# Patient Record
Sex: FEMALE | Race: WHITE
[De-identification: names, ages, dates, MRNs, and addresses within clinical notes are randomized per-mention and may not be internally consistent; named-entity substitution may affect disease eponyms.]

---

## 2018-06-13 ENCOUNTER — HOSPITAL ENCOUNTER (OUTPATIENT)
Dept: HOSPITAL 62 - WI | Age: 53
End: 2018-06-13
Payer: COMMERCIAL

## 2018-06-13 DIAGNOSIS — Z12.31: Primary | ICD-10-CM

## 2018-06-13 PROCEDURE — 77063 BREAST TOMOSYNTHESIS BI: CPT

## 2018-06-13 PROCEDURE — 77067 SCR MAMMO BI INCL CAD: CPT

## 2018-06-14 NOTE — WOMENS IMAGING REPORT
EXAM DESCRIPTION:  3D SCREENING MAMMO BILAT



COMPLETED DATE/TIME:  6/13/2018 10:40 am



REASON FOR STUDY:  ROUTINE SCREENING;Z12.31 Z12.31  ENCNTR SCREEN MAMMOGRAM FOR MALIGNANT NEOPLASM OF
 LASHAWN



COMPARISON:  Multiple since 2009



TECHNIQUE:  Standard craniocaudal and mediolateral oblique views of each breast recorded using digita
l acquisition and breast tomosynthesis.



LIMITATIONS:  None.



FINDINGS:  No masses, calcifications or architectural distortion. No areas of suspicion.

Read with the assistance of CAD.

.[facility] - R2 Cenova Version 1.3

.[facility] - R2 Cenova Version 1.3

.[facility] - R2 Cenova Version 2.4

.[facility] [place] - R2 Cenova Version 2.4

.[facility] - R2  Version 9.2



IMPRESSION:  NORMAL MAMMOGRAM.  BIRADS 1.



BREAST DENSITY:  d. The breasts are extremely dense, which lowers the sensitivity of mammography.



BIRAD:  1 NEGATIVE



RECOMMENDATION:  ROUTINE SCREENING

Please continue yearly bilateral screening tomosynthesis in June 2019



COMMENT:  The patient has been notified of the results by letter per SA requirements. Additional no
tification policies are in place for contacting patient with suspicious or incomplete findings.

Quality ID #225: The American College of Radiology recommends an annual screening mammogram for women
 aged 40 years or over. This facility utilizes a reminder system to ensure that all patients receive 
reminder letters, and/or direct phone calls for appointments. This includes reminders for routine scr
eening mammograms, diagnostic mammograms, or other Breast Imaging Interventions when appropriate.  Th
is patient will be placed in the appropriate reminder system.

The American College of Radiology (ACR) has developed recommendations for screening MRI of the breast
s in certain patient populations, to be used in conjunction with mammography.  Breast MRI surveillanc
e may be appropriate for women with more than 20% lifetime risk of developing breast cancer  as deter
mined by genetic testing, significant family history of the disease, or history of mantle radiation f
or Hodgkins Disease.  ACR Practice Guidelines 2008.

[employer]



PQRS 6045F: Fluoroscopic imaging is not utilized for breast tomosynthesis.



TECHNICAL DOCUMENTATION:  FINDING NUMBER: (1)

ASSESSMENT:  (1)

JOB ID:  [id number]

 2011 [employer]- All Rights Reserved



Reading location - IP/workstation name: [facility]-RR

## 2021-01-11 ENCOUNTER — HOSPITAL ENCOUNTER (OUTPATIENT)
Dept: HOSPITAL 62 - WI | Age: 56
End: 2021-01-11
Payer: COMMERCIAL

## 2021-01-11 DIAGNOSIS — Z12.31: Primary | ICD-10-CM

## 2021-01-11 PROCEDURE — 77067 SCR MAMMO BI INCL CAD: CPT

## 2021-01-11 PROCEDURE — 77063 BREAST TOMOSYNTHESIS BI: CPT

## 2021-01-11 NOTE — WOMENS IMAGING REPORT
EXAM DESCRIPTION:  3D SCREENING MAMMO BILAT



IMAGES COMPLETED DATE/TIME:  1/11/2021 9:24 am



REASON FOR STUDY:  ROUTINE BILATERAL SCREENING;Z12.31 Z12.31  ENCNTR SCREEN MAMMOGRAM FOR MALIGNANT N
EOPLASM OF LASHAWN



COMPARISON:  Digital tomosynthesis bilateral screening mammograms dated 6/13/2018, 6/12/2017 and 11/1
5/2010.



EXAM PARAMETERS:  Views: Standard craniocaudal and mediolateral oblique views of each breast recorded
 using digital acquisition and breast tomosynthesis.

Read with the assistance of CAD.

.[facility] - [employer]  Version 9.2



LIMITATIONS:  None.



FINDINGS:  No suspicious masses, suspicious calcifications or architectural distortion. No areas of c
oncern.



IMPRESSION:   NEGATIVE MAMMOGRAM. BIRADS 1.



BREAST DENSITY:  c. The breasts are heterogeneously dense, which may obscure small masses.



BIRAD:  ASSESSMENT:  1 NEGATIVE



RECOMMENDATION:  1.   ROUTINE SCREENING



COMMENT:  The patient has been notified of the results by letter per MQSA requirements. Additional no
tification policies are in place for contacting patient with suspicious or incomplete findings.

Quality ID #225: The American College of Radiology recommends an annual screening mammogram for women
 aged 40 years or over. This facility utilizes a reminder system to ensure that all patients receive 
reminder letters, and/or direct phone calls for appointments. This includes reminders for routine scr
eening mammograms, diagnostic mammograms, or other Breast Imaging Interventions when appropriate.  Th
is patient will be placed in the appropriate reminder system.



TECHNICAL DOCUMENTATION:  FINDING NUMBER: (1)

ASSESSMENT:  (1)

JOB ID:  [id number]

 2011 [employer]- All Rights Reserved



Reading location - IP/workstation name: [email or web address]